# Patient Record
Sex: FEMALE | Race: WHITE | Employment: FULL TIME | URBAN - METROPOLITAN AREA
[De-identification: names, ages, dates, MRNs, and addresses within clinical notes are randomized per-mention and may not be internally consistent; named-entity substitution may affect disease eponyms.]

---

## 2022-12-05 ENCOUNTER — HOSPITAL ENCOUNTER (OUTPATIENT)
Dept: CARDIAC CATH/INVASIVE PROCEDURES | Age: 22
Discharge: HOME | End: 2022-12-05
Payer: COMMERCIAL

## 2022-12-05 LAB — HCG, PREGNANCY URINE (POC): NEGATIVE

## 2022-12-05 PROCEDURE — 81025 URINE PREGNANCY TEST: CPT

## 2022-12-05 PROCEDURE — 93660 TILT TABLE EVALUATION: CPT

## 2022-12-05 NOTE — PROCEDURES
[address] [place], [place] [address]                                TILT TABLE TEST    PATIENT NAME: Shubham Neumann                      :        2000  MED REC NO:   [id number]                             ROOM:  ACCOUNT NO:   [de-identified]                           ADMIT DATE: 2022  PROVIDER:     Tony Mcgrath MD    Cardiovascular Diagnostics Department    PROCEDURE:  TILT TABLE TESTING    DATE OF PROCEDURE:  2022    HISTORY AND INDICATIONS:  This is a 57-year-old woman with recent spells  of lightheadedness, syncope and palpitation, suggesting a vasovagal  syndrome with postural sinus tachycardia. The patient has been  significantly improved over the last 2 months after starting  fludrocortisone and propranolol. There is no evidence of structural  heart disease. She comes today for tilt table testing. PROCEDURE:  After informed consent was obtained, the patient was brought  to the electrophysiology area in a fasting, unsedated state. She was  placed on the tilt table, connected to the continuous cycling blood  pressure cuff monitor and to the continuous heart rate monitor. After  baseline supine measurements were obtained, the table was placed upright  to 80 degrees for a period of 20 minutes, at which point 0.4 mg  sublingual nitroglycerin was given with the table remaining upright; 5  minutes after nitroglycerin a vasovagal response was elicited with  significant hypotension and heart rate dropping from 111 to 64 beats per  minute. The patient was nauseated, warm and near-syncopal.  She felt  better immediately after the table was placed supine. She did not lose  consciousness. She was given 500 mL of normal saline over the next 1/2 hour and  recovered easily and was discharged in good condition, 30 minutes after  tilt table testing was completed.   Findings were discussed in detail  with her and she was maintained on her current regimen. She will be  following with Dr. Malvin Bob in [place]. FINDINGS:  1. At 0 degrees supine, blood pressure was 117/75, heart rate was 69.  2.  At 2 minutes upright, blood pressure was 119/89, heart rate was 80  beats per minute. 3.  At 20 minutes upright, blood pressure was 128/70, heart rate was 85. The patient had been experiencing intermittent lightheadedness and  clamminess during upright tilt. 4.  At 20 minutes upright, 0.4 mg sublingual nitroglycerin was given. 5.  At 25 minutes upright, blood pressure had fallen to 64/52 mmHg,  heart rate falling from 111 beats per minute to 64 beats per minute. The patient was extremely lightheaded and nauseated. 6.  After 1 minute supine, blood pressure was 104/60, heart rate was 70  beats per minute; the patient was improved. IMPRESSION:    1. Vasovagal response to upright tilt with NTG, associate with with near-syncope      PLAN:    1. Continue propranolol 20 mg p.o. b.i.d.  2.  Continue fludrocortisone 0.1 mg p.o. every other day. 3.  The patient was advised to have least 32 ounces of electrolyte drink  daily. 4.  Risks for recurrent syncope despite medication were discussed with  her. 5.  The patient will follow with Dr. Malvin Bob at [address] [place] [address]. María Elena Hunt MD    D: 12/05/2022 10:30:48       T: 12/05/2022 10:34:13     [email or web address]  Job#: [id number]     Doc#: [id number]    CC:    _____Dr. Aguila Anne, at [address] [place] [address].